# Patient Record
Sex: MALE | Race: WHITE | NOT HISPANIC OR LATINO | URBAN - METROPOLITAN AREA
[De-identification: names, ages, dates, MRNs, and addresses within clinical notes are randomized per-mention and may not be internally consistent; named-entity substitution may affect disease eponyms.]

---

## 2021-08-17 ENCOUNTER — APPOINTMENT (RX ONLY)
Dept: URBAN - METROPOLITAN AREA CLINIC 15 | Facility: CLINIC | Age: 56
Setting detail: DERMATOLOGY
End: 2021-08-17

## 2021-08-17 VITALS — HEIGHT: 70 IN | WEIGHT: 195 LBS

## 2021-08-17 DIAGNOSIS — B35.1 TINEA UNGUIUM: ICD-10-CM

## 2021-08-17 PROCEDURE — ? COUNSELING

## 2021-08-17 PROCEDURE — ? ORDER TESTS

## 2021-08-17 PROCEDURE — ? PRESCRIPTION

## 2021-08-17 PROCEDURE — 99204 OFFICE O/P NEW MOD 45 MIN: CPT

## 2021-08-17 PROCEDURE — ? PRESCRIPTION MEDICATION MANAGEMENT

## 2021-08-17 RX ORDER — TERBINAFINE HYDROCHLORIDE 250 MG/1
1 TABLET ORAL QD
Qty: 30 | Refills: 1 | Status: ERX | COMMUNITY
Start: 2021-08-17

## 2021-08-17 RX ADMIN — TERBINAFINE HYDROCHLORIDE 1: 250 TABLET ORAL at 00:00

## 2021-08-17 ASSESSMENT — LOCATION SIMPLE DESCRIPTION DERM: LOCATION SIMPLE: RIGHT GREAT TOE

## 2021-08-17 ASSESSMENT — LOCATION DETAILED DESCRIPTION DERM: LOCATION DETAILED: RIGHT GREAT TOENAIL

## 2021-08-17 ASSESSMENT — LOCATION ZONE DERM: LOCATION ZONE: TOENAIL

## 2021-08-17 NOTE — HPI: INFECTION (ONYCHOMYCOSIS)
How Severe Is It?: mild
Is This A New Presentation, Or A Follow-Up?: Nail Infection
Additional History: Patient has tried OTC medications with no improvement.

## 2021-08-17 NOTE — PROCEDURE: ORDER TESTS
Performing Laboratory: 0
Billing Type: [employer]
Expected Date Of Service: 08/17/2021
Bill For Surgical Tray: no

## 2021-08-17 NOTE — PROCEDURE: PRESCRIPTION MEDICATION MANAGEMENT
Render In Strict Bullet Format?: No
Detail Level: Zone
Initiate Treatment: .\\n\\n- terbinafine HCl 250 mg tablet Take one pill once a day.

## 2021-09-21 ENCOUNTER — APPOINTMENT (RX ONLY)
Dept: URBAN - METROPOLITAN AREA CLINIC 15 | Facility: CLINIC | Age: 56
Setting detail: DERMATOLOGY
End: 2021-09-21

## 2021-09-21 VITALS — HEIGHT: 70 IN | WEIGHT: 195 LBS

## 2021-09-21 DIAGNOSIS — B35.1 TINEA UNGUIUM: ICD-10-CM | Status: IMPROVED

## 2021-09-21 PROCEDURE — 99214 OFFICE O/P EST MOD 30 MIN: CPT

## 2021-09-21 PROCEDURE — ? COUNSELING

## 2021-09-21 PROCEDURE — ? PRESCRIPTION

## 2021-09-21 PROCEDURE — ? PRESCRIPTION MEDICATION MANAGEMENT

## 2021-09-21 PROCEDURE — ? ORDER TESTS

## 2021-09-21 RX ORDER — TERBINAFINE HYDROCHLORIDE 250 MG/1
1 TABLET ORAL QD
Qty: 30 | Refills: 1 | Status: ERX

## 2021-09-21 ASSESSMENT — LOCATION SIMPLE DESCRIPTION DERM: LOCATION SIMPLE: RIGHT GREAT TOE

## 2021-09-21 ASSESSMENT — LOCATION DETAILED DESCRIPTION DERM: LOCATION DETAILED: RIGHT GREAT TOENAIL

## 2021-09-21 ASSESSMENT — LOCATION ZONE DERM: LOCATION ZONE: TOENAIL

## 2021-09-21 NOTE — PROCEDURE: PRESCRIPTION MEDICATION MANAGEMENT
Render In Strict Bullet Format?: No
Detail Level: Zone
Initiate Treatment: .\\n\\n- terbinafine HCl 250 mg tablet Take one pill once a day. \\n\\nPatient was advise to repeat labs in 30 days. Patient has a follow up scheduled for January,2021.